# Patient Record
Sex: MALE | Race: WHITE | URBAN - METROPOLITAN AREA
[De-identification: names, ages, dates, MRNs, and addresses within clinical notes are randomized per-mention and may not be internally consistent; named-entity substitution may affect disease eponyms.]

---

## 2017-06-13 ENCOUNTER — OFFICE VISIT (OUTPATIENT)
Dept: URGENT CARE | Facility: URGENT CARE | Age: 52
End: 2017-06-13
Payer: COMMERCIAL

## 2017-06-13 ENCOUNTER — HOSPITAL ENCOUNTER (OUTPATIENT)
Facility: CLINIC | Age: 52
Setting detail: OBSERVATION
Discharge: HOME | End: 2017-06-14
Attending: PHYSICIAN ASSISTANT | Admitting: HOSPITALIST
Payer: COMMERCIAL

## 2017-06-13 ENCOUNTER — APPOINTMENT (OUTPATIENT)
Dept: GENERAL RADIOLOGY | Facility: CLINIC | Age: 52
End: 2017-06-13
Attending: PHYSICIAN ASSISTANT
Payer: COMMERCIAL

## 2017-06-13 VITALS
OXYGEN SATURATION: 98 % | HEART RATE: 80 BPM | SYSTOLIC BLOOD PRESSURE: 160 MMHG | TEMPERATURE: 98.1 F | DIASTOLIC BLOOD PRESSURE: 106 MMHG | WEIGHT: 250 LBS

## 2017-06-13 DIAGNOSIS — R07.9 ACUTE CHEST PAIN: ICD-10-CM

## 2017-06-13 DIAGNOSIS — R10.13 ABDOMINAL PAIN, EPIGASTRIC: Primary | ICD-10-CM

## 2017-06-13 DIAGNOSIS — R07.89 ATYPICAL CHEST PAIN: ICD-10-CM

## 2017-06-13 DIAGNOSIS — I10 BENIGN ESSENTIAL HYPERTENSION: ICD-10-CM

## 2017-06-13 LAB
ALBUMIN SERPL-MCNC: 4 G/DL (ref 3.4–5)
ALP SERPL-CCNC: 71 U/L (ref 40–150)
ALT SERPL W P-5'-P-CCNC: 66 U/L (ref 0–70)
ANION GAP SERPL CALCULATED.3IONS-SCNC: 9 MMOL/L (ref 3–14)
AST SERPL W P-5'-P-CCNC: 36 U/L (ref 0–45)
BASOPHILS # BLD AUTO: 0 10E9/L (ref 0–0.2)
BASOPHILS NFR BLD AUTO: 0.4 %
BILIRUB SERPL-MCNC: 0.5 MG/DL (ref 0.2–1.3)
BUN SERPL-MCNC: 23 MG/DL (ref 7–30)
CALCIUM SERPL-MCNC: 8.8 MG/DL (ref 8.5–10.1)
CHLORIDE SERPL-SCNC: 109 MMOL/L (ref 94–109)
CO2 SERPL-SCNC: 22 MMOL/L (ref 20–32)
CREAT SERPL-MCNC: 0.84 MG/DL (ref 0.66–1.25)
D DIMER PPP FEU-MCNC: NORMAL UG/ML FEU (ref 0–0.5)
DIFFERENTIAL METHOD BLD: NORMAL
EOSINOPHIL # BLD AUTO: 0.1 10E9/L (ref 0–0.7)
EOSINOPHIL NFR BLD AUTO: 1.1 %
ERYTHROCYTE [DISTWIDTH] IN BLOOD BY AUTOMATED COUNT: 12.8 % (ref 10–15)
GFR SERPL CREATININE-BSD FRML MDRD: ABNORMAL ML/MIN/1.7M2
GLUCOSE SERPL-MCNC: 123 MG/DL (ref 70–99)
HBA1C MFR BLD: 5.3 % (ref 4.3–6)
HCT VFR BLD AUTO: 40.3 % (ref 40–53)
HGB BLD-MCNC: 14.2 G/DL (ref 13.3–17.7)
IMM GRANULOCYTES # BLD: 0 10E9/L (ref 0–0.4)
IMM GRANULOCYTES NFR BLD: 0.1 %
INTERPRETATION ECG - MUSE: NORMAL
LIPASE SERPL-CCNC: 207 U/L (ref 73–393)
LYMPHOCYTES # BLD AUTO: 1.5 10E9/L (ref 0.8–5.3)
LYMPHOCYTES NFR BLD AUTO: 22.1 %
MCH RBC QN AUTO: 31.6 PG (ref 26.5–33)
MCHC RBC AUTO-ENTMCNC: 35.2 G/DL (ref 31.5–36.5)
MCV RBC AUTO: 90 FL (ref 78–100)
MONOCYTES # BLD AUTO: 0.2 10E9/L (ref 0–1.3)
MONOCYTES NFR BLD AUTO: 3 %
NEUTROPHILS # BLD AUTO: 5.1 10E9/L (ref 1.6–8.3)
NEUTROPHILS NFR BLD AUTO: 73.3 %
NRBC # BLD AUTO: 0 10*3/UL
NRBC BLD AUTO-RTO: 0 /100
PLATELET # BLD AUTO: 188 10E9/L (ref 150–450)
POTASSIUM SERPL-SCNC: 4.4 MMOL/L (ref 3.4–5.3)
PROT SERPL-MCNC: 7.5 G/DL (ref 6.8–8.8)
RBC # BLD AUTO: 4.5 10E12/L (ref 4.4–5.9)
SODIUM SERPL-SCNC: 140 MMOL/L (ref 133–144)
TROPONIN I SERPL-MCNC: NORMAL UG/L (ref 0–0.04)
TROPONIN I SERPL-MCNC: NORMAL UG/L (ref 0–0.04)
WBC # BLD AUTO: 7 10E9/L (ref 4–11)

## 2017-06-13 PROCEDURE — 25000132 ZZH RX MED GY IP 250 OP 250 PS 637: Performed by: PHYSICIAN ASSISTANT

## 2017-06-13 PROCEDURE — 80053 COMPREHEN METABOLIC PANEL: CPT | Performed by: PHYSICIAN ASSISTANT

## 2017-06-13 PROCEDURE — 85025 COMPLETE CBC W/AUTO DIFF WBC: CPT | Performed by: PHYSICIAN ASSISTANT

## 2017-06-13 PROCEDURE — 84484 ASSAY OF TROPONIN QUANT: CPT | Performed by: PHYSICIAN ASSISTANT

## 2017-06-13 PROCEDURE — G0378 HOSPITAL OBSERVATION PER HR: HCPCS

## 2017-06-13 PROCEDURE — 83036 HEMOGLOBIN GLYCOSYLATED A1C: CPT | Performed by: PHYSICIAN ASSISTANT

## 2017-06-13 PROCEDURE — 71020 XR CHEST 2 VW: CPT

## 2017-06-13 PROCEDURE — 36415 COLL VENOUS BLD VENIPUNCTURE: CPT | Performed by: HOSPITALIST

## 2017-06-13 PROCEDURE — 99220 ZZC INITIAL OBSERVATION CARE,LEVL III: CPT | Performed by: HOSPITALIST

## 2017-06-13 PROCEDURE — 84484 ASSAY OF TROPONIN QUANT: CPT | Performed by: HOSPITALIST

## 2017-06-13 PROCEDURE — 99285 EMERGENCY DEPT VISIT HI MDM: CPT | Mod: 25

## 2017-06-13 PROCEDURE — 99207 ZZC OFFICE-HOSPITAL ADMIT: CPT | Performed by: PHYSICIAN ASSISTANT

## 2017-06-13 PROCEDURE — 85379 FIBRIN DEGRADATION QUANT: CPT | Performed by: PHYSICIAN ASSISTANT

## 2017-06-13 PROCEDURE — 93005 ELECTROCARDIOGRAM TRACING: CPT

## 2017-06-13 PROCEDURE — 83690 ASSAY OF LIPASE: CPT | Performed by: PHYSICIAN ASSISTANT

## 2017-06-13 PROCEDURE — 93005 ELECTROCARDIOGRAM TRACING: CPT | Performed by: PHYSICIAN ASSISTANT

## 2017-06-13 RX ORDER — NITROGLYCERIN 0.4 MG/1
0.4 TABLET SUBLINGUAL EVERY 5 MIN PRN
Status: DISCONTINUED | OUTPATIENT
Start: 2017-06-13 | End: 2017-06-13

## 2017-06-13 RX ORDER — NITROGLYCERIN 0.4 MG/1
0.4 TABLET SUBLINGUAL EVERY 5 MIN PRN
Status: DISCONTINUED | OUTPATIENT
Start: 2017-06-13 | End: 2017-06-14 | Stop reason: HOSPADM

## 2017-06-13 RX ORDER — ROSUVASTATIN CALCIUM 10 MG/1
10 TABLET, COATED ORAL DAILY
COMMUNITY

## 2017-06-13 RX ORDER — ASPIRIN 81 MG/1
162 TABLET, CHEWABLE ORAL ONCE
Status: DISCONTINUED | OUTPATIENT
Start: 2017-06-13 | End: 2017-06-13

## 2017-06-13 RX ORDER — ONDANSETRON 4 MG/1
4 TABLET, ORALLY DISINTEGRATING ORAL EVERY 6 HOURS PRN
Status: DISCONTINUED | OUTPATIENT
Start: 2017-06-13 | End: 2017-06-14 | Stop reason: HOSPADM

## 2017-06-13 RX ORDER — ASPIRIN 81 MG/1
324 TABLET, CHEWABLE ORAL ONCE
Status: COMPLETED | OUTPATIENT
Start: 2017-06-13 | End: 2017-06-13

## 2017-06-13 RX ORDER — NALOXONE HYDROCHLORIDE 0.4 MG/ML
.1-.4 INJECTION, SOLUTION INTRAMUSCULAR; INTRAVENOUS; SUBCUTANEOUS
Status: DISCONTINUED | OUTPATIENT
Start: 2017-06-13 | End: 2017-06-14 | Stop reason: HOSPADM

## 2017-06-13 RX ORDER — MORPHINE SULFATE 2 MG/ML
1 INJECTION, SOLUTION INTRAMUSCULAR; INTRAVENOUS
Status: DISCONTINUED | OUTPATIENT
Start: 2017-06-13 | End: 2017-06-14 | Stop reason: HOSPADM

## 2017-06-13 RX ORDER — ONDANSETRON 2 MG/ML
4 INJECTION INTRAMUSCULAR; INTRAVENOUS EVERY 6 HOURS PRN
Status: DISCONTINUED | OUTPATIENT
Start: 2017-06-13 | End: 2017-06-14 | Stop reason: HOSPADM

## 2017-06-13 RX ORDER — OXYCODONE AND ACETAMINOPHEN 5; 325 MG/1; MG/1
1-2 TABLET ORAL EVERY 4 HOURS PRN
Status: DISCONTINUED | OUTPATIENT
Start: 2017-06-13 | End: 2017-06-14 | Stop reason: HOSPADM

## 2017-06-13 RX ORDER — ALUMINA, MAGNESIA, AND SIMETHICONE 2400; 2400; 240 MG/30ML; MG/30ML; MG/30ML
15-30 SUSPENSION ORAL EVERY 4 HOURS PRN
Status: DISCONTINUED | OUTPATIENT
Start: 2017-06-13 | End: 2017-06-14 | Stop reason: HOSPADM

## 2017-06-13 RX ORDER — ASPIRIN 81 MG/1
81 TABLET ORAL DAILY
Status: DISCONTINUED | OUTPATIENT
Start: 2017-06-14 | End: 2017-06-14 | Stop reason: HOSPADM

## 2017-06-13 RX ORDER — ACETAMINOPHEN 325 MG/1
650 TABLET ORAL EVERY 4 HOURS PRN
Status: DISCONTINUED | OUTPATIENT
Start: 2017-06-13 | End: 2017-06-14 | Stop reason: HOSPADM

## 2017-06-13 RX ORDER — ACETAMINOPHEN 650 MG/1
650 SUPPOSITORY RECTAL EVERY 4 HOURS PRN
Status: DISCONTINUED | OUTPATIENT
Start: 2017-06-13 | End: 2017-06-14 | Stop reason: HOSPADM

## 2017-06-13 RX ORDER — LIDOCAINE 40 MG/G
CREAM TOPICAL
Status: DISCONTINUED | OUTPATIENT
Start: 2017-06-13 | End: 2017-06-14 | Stop reason: HOSPADM

## 2017-06-13 RX ADMIN — ASPIRIN 81 MG 324 MG: 81 TABLET ORAL at 15:20

## 2017-06-13 RX ADMIN — NITROGLYCERIN 0.4 MG: 0.4 TABLET SUBLINGUAL at 14:15

## 2017-06-13 RX ADMIN — NITROGLYCERIN 0.4 MG: 0.4 TABLET SUBLINGUAL at 14:45

## 2017-06-13 ASSESSMENT — ENCOUNTER SYMPTOMS
DIAPHORESIS: 1
SHORTNESS OF BREATH: 0
NAUSEA: 1
ABDOMINAL PAIN: 1
VOMITING: 0

## 2017-06-13 NOTE — PROGRESS NOTES
"SUBJECTIVE  HPI:  Dwight Barry is a 52 year old male who presents with the CC of abdominal/epigastric pain, pain into the back    Pain is located in the epigastric area, with radiation to back.  The pain is characterized as sharp, stabbing and \"pain\", and at worst is a level 8 on a scale of 1-10.  Pain has been present for 1 hours(s) and is fluctuating.  EXACERBATING FACTORS: nothing makes it better or worsen  RELIEVING FACTORS: nothing.  ASSOCIATED SX: sweating.    No past medical history on file.     ALLERGIES  No Known Allergies     Social History   Substance Use Topics     Smoking status: Never Smoker     Smokeless tobacco: Not on file     Alcohol use Not on file       ROS:  CONSTITUTIONAL:NEGATIVE for fever, chills, change in weight  INTEGUMENTARY/SKIN: POSITIVE for mild sweating  ENT/MOUTH: NEGATIVE for ear, mouth and throat problems  RESP:NEGATIVE for significant cough or SOB  CV: NEGATIVE for chest pain, palpitations or peripheral edema  GI: Positive for epigastric pain, sharp and localized  MUSCULOSKELETAL: Positive for back ache  NEURO: NEGATIVE for weakness, dizziness or paresthesias    OBJECTIVE:  BP (!) 160/106 (BP Location: Left arm, Patient Position: Sitting, Cuff Size: Adult Large)  Pulse 80  Temp 98.1  F (36.7  C) (Oral)  Wt 250 lb (113.4 kg)  SpO2 98%  GENERAL APPEARANCE: healthy, alert and no distress  NECK: supple, nontender, no lymphadenopathy  RESP: lungs clear to auscultation - no rales, rhonchi or wheezes  CV: regular rates and rhythm, normal S1 S2, no murmur noted  ABDOMEN: tenderness moderate epigastric  MS: Positive for backache  NEURO: Normal strength and tone, sensory exam grossly normal,  normal speech and mentation  SKIN: no suspicious lesions or rashes    ASSESSMENT/PLAN:      ICD-10-CM    1. Chest pain R07.9 EKG 12-LEAD RADIOLOGY   2. Abdominal pain, epigastric R10.13 lidocaine 15 mL, alum & mag hydroxide-simethicone 15 mL GI Cocktail       Patient given GI cocktail , no " improvement in symptoms  EMS called  Patient taken to [facility]  Patient See Orders in Epic

## 2017-06-13 NOTE — ED NOTES
[facility]  ED Nurse Handoff Report    ED Chief complaint: Abdominal Pain (sudden onset of epigastric pain during a meeting and went to urgent care they called EMS to bring him here for futher eval )      ED Diagnosis:   Final diagnoses:   None       Code Status: Full Code    Allergies: No Known Allergies    Activity level - Baseline/Home:  Independent    Activity Level - Current:   Independent     Needed?: No    Isolation: No  Infection: Not Applicable    Bariatric?: No    Vital Signs:   Vitals:    06/13/17 1300 06/13/17 1411 06/13/17 1413 06/13/17 1414   BP: (!) 155/105 (!) 165/103  (!) 165/103   Resp: 10      Temp:       TempSrc:       SpO2: 97%  100% 97%   Height:           Cardiac Rhythm: ,        Pain level: 0-10 Pain Scale: 1    Is this patient confused?: No    Patient Report: Initial Complaint: Patient came into the ED with abdominal pain described as burning and constant.   Focused Assessment: Respiratory, cardiac and neuro systems were checked per standard as well as GI assessment completed due to pain complaints. Pt is staying over night to rule out cardiac   Tests Performed:Patient had chest x ray, PA and Lat as well as CMP, troponin, EKG and D-dimer completed    Abnormal Results: Patient blood work showed an elevated Blood glucose and he has also had a few elevated blood pressure readings but these seemed to normalize after the admin of the two nitroglycerin tabs   Treatments provided: Patient was given Nitroglycerin tablets (X2)     Family Comments: none; pt from [place] and is here on business     OBS brochure/video discussed/provided to patient: No    ED Medications:   Medications   nitroglycerin (NITROSTAT) sublingual tablet 0.4 mg (0.4 mg Sublingual Given 6/13/17 1445)       Drips infusing?:  No      ED NURSE PHONE NUMBER: *[phone]

## 2017-06-13 NOTE — IP AVS SNAPSHOT
[facility] Observation Unit    [address] [place] [id number]    Phone:  [phone]                                       After Visit Summary   6/13/2017    Dwight Barry    MRN: [id number]           After Visit Summary Signature Page     I have received my discharge instructions, and my questions have been answered. I have discussed any challenges I see with this plan with the nurse or doctor.    ..........................................................................................................................................  Patient/Patient Representative Signature      ..........................................................................................................................................  Patient Representative Print Name and Relationship to Patient    ..................................................               ................................................  Date                                            Time    ..........................................................................................................................................  Reviewed by Signature/Title    ...................................................              ..............................................  Date                                                            Time

## 2017-06-13 NOTE — IP AVS SNAPSHOT
MRN:[id number]                      After Visit Summary   6/13/2017    Dwight Barry    MRN: [id number]           Thank you!     Thank you for choosing [facility] for your care. Our goal is always to provide you with excellent care. Hearing back from our patients is one way we can continue to improve our services. Please take a few minutes to complete the written survey that you may receive in the mail after you visit with us. Thank you!        Patient Information     Date Of Birth          1965        About your hospital stay     You were admitted on:  June 13, 2017 [facility]    You were discharged on:  June 14, 2017        Reason for your hospital stay       You were registered to observation due to sternal pain.                  Who to Call     For medical emergencies, please call 911.  For non-urgent questions about your medical care, please call your primary care provider or clinic, None          Attending Provider     Provider Specialty    Viki Finley PA-C Physician Assistant    Juan David Avitia MD Internal Medicine       Primary Care Provider    None Specified      After Care Instructions     Activity       Your activity upon discharge: activity as tolerated            Diet       Follow this diet upon discharge: Orders Placed This Encounter      Combination Diet Low Saturated Fat Na <2400mg Diet, No Caffeine Diet                  Follow-up Appointments     Follow-up and recommended labs and tests        Follow up with primary care provider, No primary care provider on file., within 7 days for hospital follow- up.  The following labs/tests are recommended: Recommend outpatient cardiac stress test.                  Pending Results     No orders found for last 3 day(s).            Statement of Approval     Ordered          06/14/17 0820  I have reviewed and agree with all the recommendations and orders detailed in this  "document.  EFFECTIVE NOW     Approved and electronically signed by:  Stephen Allred PA-C             Admission Information     Date & Time Provider Department Dept. Phone    2017 Juan David Avitia MD [facility] Observation Unit [phone]      Your Vitals Were     Blood Pressure Temperature Respirations Height Pulse Oximetry BMI (Body Mass Index)    132/79 (BP Location: Right arm) 97.6  F (36.4  C) (Oral) 16 1.778 m (5' 10\") 98% 35.87 kg/m2      MyChart Information     [facility] lets you send messages to your doctor, view your test results, renew your prescriptions, schedule appointments and more. To sign up, go to [email or web address] . Click on \"Log in\" on the left side of the screen, which will take you to the Welcome page. Then click on \"Sign up Now\" on the right side of the page.     You will be asked to enter the access code listed below, as well as some personal information. Please follow the directions to create your username and password.     Your access code is: [email or web address]  Expires: 2017 12:30 PM     Your access code will  in 90 days. If you need help or a new code, please call your [facility] clinic or [phone].        Care EveryWhere ID     This is your Care EveryWhere ID. This could be used by other organizations to access your [facility] medical records  [id number]           Review of your medicines      CONTINUE these medicines which have NOT CHANGED        Dose / Directions    ALLEGRA PO   Used for:  Abdominal pain, epigastric, Atypical chest pain        Dose:  180 mg   Take 180 mg by mouth daily as needed   Refills:  0       CRESTOR 10 MG tablet   Used for:  Abdominal pain, epigastric, Atypical chest pain   Generic drug:  rosuvastatin        Dose:  10 mg   Take 10 mg by mouth daily   Refills:  0       VITAMIN D (CHOLECALCIFEROL) PO        Dose:  5000 Units   Take 5,000 Units by mouth daily   Refills:  0         STOP taking     lidocaine 15 mL, alum & mag " hydroxide-simethicone 15 mL GI Cocktail                    Protect others around you: Learn how to safely use, store and throw away your medicines at www.disposemymeds.org.             Medication List: This is a list of all your medications and when to take them. Check marks below indicate your daily home schedule. Keep this list as a reference.      Medications           Morning Afternoon Evening Bedtime As Needed    ALLEGRA PO   Take 180 mg by mouth daily as needed                                CRESTOR 10 MG tablet   Take 10 mg by mouth daily   Generic drug:  rosuvastatin                                VITAMIN D (CHOLECALCIFEROL) PO   Take 5,000 Units by mouth daily

## 2017-06-13 NOTE — ED PROVIDER NOTES
History     Chief Complaint:  Abdominal Pain      HPI   Dwight Barry is a 52 year old male with a past medical history of hyperlipidemia who presents via EMS for evaluation of abdominal pain.  The patient reports he had sudden onset of epigastric abdominal pain radiating into his chest around 1045 while in a work meeting. The pain was radiating into his back with associated nausea and diaphoresis and so he presented at urgent care, but ambulance was called to bring the patient to the ED for evaluation. He was given a cocktail in the ambulance which he notes helped his pain slightly, currently rated as a 3/10 in severity. He denies shortness of breath or vomiting. He reports having two drinks last night but denies excessive alcohol use. He is s/p cholecystectomy and appendectomy.     Cardiac/PE/DVT Risk Factors:  The patient has  history of hyperlipidemia but no history of hypertension, diabetes, no smoking. He reports no family history of CAD. The patient denies any personal or familial history of PE, DVT, or clotting disorder. The patient reports no recent travel, surgery, or other immobilizations.       Allergies:  NKDA    Medications:    Crestor  Allegra  Lidocaine     Past Medical History:    Hyperlipidemia     Past Surgical History:    Cholecystectomy  Diverticulitis surgery     Family History:    History reviewed.  No significant family history.     Social History:  Marital Status:     Smoking status: Never smoker  Alcohol use: Yes     Review of Systems   Constitutional: Positive for diaphoresis.   Respiratory: Negative for shortness of breath.    Cardiovascular: Positive for chest pain. Negative for leg swelling.   Gastrointestinal: Positive for abdominal pain and nausea. Negative for vomiting.   All other systems reviewed and are negative.    Physical Exam     Patient Vitals for the past 24 hrs:   BP Temp Temp src Heart Rate Resp SpO2 Height   06/13/17 1558 125/82 97.7  F (36.5  C) Oral 63 12 97  "% -   06/13/17 1532 - - - - - 98 % -   06/13/17 1530 116/84 - - - - - -   06/13/17 1515 - - - - - 97 % -   06/13/17 1500 107/79 - - - - 97 % -   06/13/17 1414 (!) 165/103 - - 67 - 97 % -   06/13/17 1413 - - - - - 100 % -   06/13/17 1411 (!) 165/103 - - - - - -   06/13/17 1300 (!) 155/105 - - 67 10 97 % -   06/13/17 1255 (!) 163/120 98.4  F (36.9  C) Oral 69 - 99 % 1.778 m (5' 10\")      Physical Exam  Nursing note and vitals reviewed.     GENERAL: Alert, mild distress, non toxic appearing.   HEENT: Normal conjunctiva. No scleral icterus. MMM.   NECK: Supple.  CARDIAC: Normal rate and regular rhythm. Normal heart sounds. No murmurs, rubs, or gallops appreciated. Intact distal radial pulses 2+.   PULMONARY: CTA bilaterally. Normal breath sounds. No wheezing, crackles, or rhonchi appreciated.  ABDOMEN: Soft, non distended abdomen. Mild epigastric tenderness. No rebound or guarding.   NEURO: Alert and oriented. Non-focal.    MUSCULOSKELETAL: Normal range of motion. No peripheral edema.    SKIN: Skin is warm and dry. No rashes. No pallor or jaundice.   PSYCH: Normal affect and mood.       Emergency Department Course   ECG @ 1317  Indication: Epigastric pain  Rate 64 bpm.   WI interval 190 ms.   QRS duration 80 ms.   QT/QTc 382/394 ms.   P-R-T axes -8.  Notes: Normal sinus rhythm. Inferior infarct, age undetermined.   Time read 1324    Imaging:  Radiographic findings were communicated with the patient who voiced understanding of the findings.    Chest XR,  PA & LAT  Final Result  IMPRESSION: Cardiac silhouette and pulmonary vasculature are within  normal limits. No focal airspace disease, pleural effusion or  pneumothorax.    JAM JARQUIN    Laboratory:  CBC: WBC 7.0 (WNL) HGB 14.2 (WNL)  (WNL)   CMP: Cr 0.84 (WNL) Glucose 123 (H)  Rest WNL  1251: Troponin I: <0.015 (WNL)  Lipase: 207 (WNL)  D dimer: <0.3 (WNL)     Interventions:  1415: Nitroglycerin, 0.4 mg, Sublingual x 2   1520: Aspirin, 324 mg, Oral    ED " Course:  The patient arrived by ambulance. He was escorted to the ED where his vitals were measured and recorded.   Nursing notes and past medical history reviewed.   I performed a physical examination of the patient as documented above.  I explained the plan with the patient who consents to this.   The above workups were undertaken.   1415: The patient was updated.   1500: The patient had complete resolution of his pain after 2 nitro.  I personally reviewed the laboratory and imaging results with the Patient and answered all related questions prior to admission.  Findings and plan explained to the Patient who consents to admission. Discussed the patient with Dr. Avitia at 1513, who will admit the patient to an obs bed for further monitoring, evaluation, and treatment.     Impression & Plan      Medical Decision Making:  Dwight Barry is a 52 year old male with a history of hyperlipidemia who presents with concern for sudden onset of epigastric pain radiating into his chest around 1045 while sitting at a meeting with associated nausea and diaphoresis. Here, he is afebrile, hypertensive but otherwise hemodynamically stable and nontoxic appearing. He does have mild reproducible tenderness over the epigastric region. Lipase is within normal limit making pancreatitis unlikely. No elevation of LFTs and he is status post cholecystectomy. No vomiting or constipation concerning for obstruction. No peritoneal signs thus doubt any acute intraabdominal catastrophe and do not feel advanced imaging with CT is indicated. CXR without evidence of pneumonia, pleural effusion, or pneumothorax. Doubt PE given normal d-dimer and no significant PE risk factors. Low suspicion for dissection given no widened mediastinum on imaging, intact pulses and normal d-dimer.     Initial EKG does not show any acute ischemic changes and troponin is unremarkable. However given his clinical history, I was concerned for possible ACS. I did  administer 2 nitro and he had complete resolution of his pain following this. Aspirin also administered. With his improvement following nitro and timing of onset of symptoms being shortly prior to arrival, admission to the hospital indicated for formal cardiac evaluation including serial troponin and stress test. Patient in agreement with plan. I discussed the patient with Dr. Avitia of hospitalist service who accepted the patient for admission. The patient remained hemodynamically stable in the ED. Blood pressure improving following nitro administration.    Diagnosis:    ICD-10-CM    1. Acute chest pain R07.9    2. Benign essential hypertension I10        Disposition:   Admit to an obs bed under the care of Dr. Avitia.       Delmar KEANE, am serving as a scribe on 6/13/2017 at 1:03 PM to personally document services performed by Viki Finley PA-C based on my observations and the provider's statements to me.       Viki Finley PA-C  06/13/17 9157

## 2017-06-13 NOTE — PHARMACY-ADMISSION MEDICATION HISTORY
Admission medication history interview status for the 6/13/2017  admission is complete. See EPIC admission navigator for prior to admission medications     Medication history source reliability:Good    Actions taken by pharmacist (provider contacted, etc):None     Additional medication history information not noted on PTA med list :None    Medication reconciliation/reorder completed by provider prior to medication history? Yes    Time spent in this activity: 5 min    Prior to Admission medications    Medication Sig Last Dose Taking? Auth Provider   rosuvastatin (CRESTOR) 10 MG tablet Take 10 mg by mouth daily  6/13/2017 at Unknown time Yes Reported, Patient   Fexofenadine HCl (ALLEGRA PO) Take 180 mg by mouth daily as needed  6/13/2017 at Unknown time Yes Reported, Patient   lidocaine 15 mL, alum & mag hydroxide-simethicone 15 mL GI Cocktail 30 mLs by Oral or Feeding Tube route once for 1 dose 6/13/2017 at Unknown time Yes Paul Tam PA-C   VITAMIN D, CHOLECALCIFEROL, PO Take 5,000 Units by mouth daily  6/13/2017 at Unknown time Yes Reported, Patient

## 2017-06-13 NOTE — MR AVS SNAPSHOT
"              After Visit Summary   2017    Dwight Barry    MRN: [id number]           Patient Information     Date Of Birth          1965        Visit Information        Provider Department      2017 12:05 PM Paul Tam PA-C [facility]        Today's Diagnoses     Abdominal pain, epigastric    -  1    Atypical chest pain           Follow-ups after your visit        Who to contact     If you have questions or need follow up information about today's clinic visit or your schedule please contact [facility] directly at [phone].  Normal or non-critical lab and imaging results will be communicated to you by [employer], letter or phone within 4 business days after the clinic has received the results. If you do not hear from us within 7 days, please contact the clinic through [employer] or phone. If you have a critical or abnormal lab result, we will notify you by phone as soon as possible.  Submit refill requests through [employer] or call your pharmacy and they will forward the refill request to us. Please allow 3 business days for your refill to be completed.          Additional Information About Your Visit        MyChart Information     [facility] lets you send messages to your doctor, view your test results, renew your prescriptions, schedule appointments and more. To sign up, go to [email or web address] . Click on \"Log in\" on the left side of the screen, which will take you to the Welcome page. Then click on \"Sign up Now\" on the right side of the page.     You will be asked to enter the access code listed below, as well as some personal information. Please follow the directions to create your username and password.     Your access code is: [email or web address]  Expires: 2017 12:30 PM     Your access code will  in 90 days. If you need help or a new code, please call your [facility] clinic or [phone].        Care EveryWhere ID     This " is your Care EveryWhere ID. This could be used by other organizations to access your [place] medical records  [id number]        Your Vitals Were     Pulse Temperature Pulse Oximetry             80 98.1  F (36.7  C) (Oral) 98%          Blood Pressure from Last 3 Encounters:   06/13/17 (!) 160/106    Weight from Last 3 Encounters:   06/13/17 250 lb (113.4 kg)              We Performed the Following     EKG 12-LEAD RADIOLOGY          Today's Medication Changes          These changes are accurate as of: 6/13/17 12:30 PM.  If you have any questions, ask your nurse or doctor.               Start taking these medicines.        Dose/Directions    lidocaine 15 mL, alum & mag hydroxide-simethicone 15 mL GI Cocktail   Used for:  Abdominal pain, epigastric, Atypical chest pain   Started by:  Paul aTm PA-C        Dose:  30 mL   30 mLs by Oral or Feeding Tube route once for 1 dose   Quantity:  30 mL   Refills:  0            Where to get your medicines      Some of these will need a paper prescription and others can be bought over the counter.  Ask your nurse if you have questions.     You don't need a prescription for these medications     lidocaine 15 mL, alum & mag hydroxide-simethicone 15 mL GI Cocktail                Primary Care Provider    None Specified       No primary provider on file.        Thank you!     Thank you for choosing [facility]  for your care. Our goal is always to provide you with excellent care. Hearing back from our patients is one way we can continue to improve our services. Please take a few minutes to complete the written survey that you may receive in the mail after your visit with us. Thank you!             Your Updated Medication List - Protect others around you: Learn how to safely use, store and throw away your medicines at www.disposemymeds.org.          This list is accurate as of: 6/13/17 12:30 PM.  Always use your most recent med list.                    Brand Name Dispense Instructions for use    ALLEGRA PO          CRESTOR 10 MG tablet   Generic drug:  rosuvastatin      Take by mouth daily       lidocaine 15 mL, alum & mag hydroxide-simethicone 15 mL GI Cocktail     30 mL    30 mLs by Oral or Feeding Tube route once for 1 dose

## 2017-06-13 NOTE — NURSING NOTE
No chief complaint on file.      Initial BP (!) 160/106  Pulse 80  Temp 98.1  F (36.7  C) (Oral)  Wt 250 lb (113.4 kg)  SpO2 98% There is no height or weight on file to calculate BMI.  Medication Reconciliation: complete   Denia ALTAMIRANO

## 2017-06-13 NOTE — ED NOTES
Bed: ED05  Expected date:   Expected time:   Means of arrival:   Comments:  518  55 M epigastric pain from clinic  1236

## 2017-06-13 NOTE — H&P
[facility]    History and Physical  Hospitalist       Date of Admission:  6/13/2017    Assessment & Plan   Dwight Barry is a 52 year old male with past history of hyperlipidemia and pre-diabetes who presents with chest pain.    Chest pain, suspect GI related: Presents with substernal and epigastric sharp pain associated with nausea and diaphoresis.  Initial troponin negative.  EKG with T-wave inversion in lead III, unchanged on repeat with resolution of discomfort.  CXR clear.  LFT's, lipase and D-dimer wnl.  Is s/p cholecystectomy but cannot rule out passage of gallstone based on description of pain.  Esophageal spasm or GERD also in differential though no history of these.  Only known risk factor for ACS is hyperlipidemia.  Note he was markedly hypertensive upon arrival - suspect this was due to pain as has subsequently normalized, but may consider uncontrolled blood pressure causing cardiac strain (though no prior history of HTN but has a stressful job).  - rule out ACS with serial trop, check A1C and fasting lipid panel  - daily aspirin  - low suspicion for ACS, will hold on stress test and have him follow up with PCP for this unless he has recurrence of symptoms while here  - hold statin unless rules in    Pre-DM:  Will check A1C but will not monitor POC glucose while here.    DVT Prophylaxis: Low Risk/Ambulatory with no VTE prophylaxis indicated  Code Status: Full Code    Disposition: Expected discharge tomorrow if rules out.    Juan David Avitia    Primary Care Physician   Dr. Schmitz of [facility] in [place]    Chief Complaint   Chest pain    History is obtained from the patient    History of Present Illness   Dwight Barry is a 52 year old male who presents with chest pain.  Patient reports he was sitting in a meeting at approximately 10 or 10:30 this morning when he had sudden onset of sharp pain in the epigastric and substernal region.  This was associated with diaphoresis  and nausea.  Pain progressed throughout the meeting and began to radiate to his back.  He denies any associated palpitations, shortness of breath or lightheadedness and has never had symptoms in the past.  Pain did not worsen with walking out of the office.  He presented to an urgent care where a GI cocktail resulted in no immediate improvement, but he does report his pain gradually resolved over time.  He reported he was not given any nitroglycerin, though the MAR here states he received 2 dose, with the ED PA stating pain resolved with this.  He denies any history of similar symptoms in the past.  He runs approximately 3 miles on the treadmill about three times per week and denies any exertional chest pain or pressure.  He does report he has been more tired over the past three months but otherwise denies any cardiorespiratory symptoms.  He denies any lower extremity edema or orthopnea.  Reports no history of GERD, denies any abdominal distention.  He denies any recent fevers or chills or diarrhea.  He denies any musculoskeletal strain or change in exercise regimen.  He denies any recent cold symptoms or cough.  He is currently pain-free and his review of systems is otherwise negative.    Past Medical History    Hyperlipidemia  Prediabetes    Past Surgical History   History perforated colon status post partial colectomy with colostomy status post takedown  He also had an appendectomy and cholecystectomy during above surgery    Prior to Admission Medications   Prior to Admission Medications   Prescriptions Last Dose Informant Patient Reported? Taking?   Fexofenadine HCl (ALLEGRA PO) 2017 at Unknown time Self Yes Yes   Sig: Take 180 mg by mouth daily as needed    VITAMIN D, CHOLECALCIFEROL, PO 2017 at Unknown time Self Yes Yes   Sig: Take 5,000 Units by mouth daily    lidocaine 15 mL, alum & mag hydroxide-simethicone 15 mL GI Cocktail 2017 at Unknown time Self No Yes   Si mLs by Oral or Feeding  Tube route once for 1 dose   rosuvastatin (CRESTOR) 10 MG tablet 6/13/2017 at Unknown time Self Yes Yes   Sig: Take 10 mg by mouth daily       Facility-Administered Medications: None     Allergies   No Known Allergies    Social History   I have personally reviewed the social history with the patient showing no prior history of tobacco use.  He reports two alcoholic beverages per day.  Denies any illicit drug use.  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    Family History   No family history of heart disease or stroke.    Review of Systems   The 10 point Review of Systems is negative other than noted in the HPI or here.     Physical Exam   Temp: 98.4  F (36.9  C) Temp src: Oral BP: 116/84   Heart Rate: 67 Resp: 10 SpO2: 98 % O2 Device: None (Room air)    Vital Signs with Ranges  Temp:  [98.1  F (36.7  C)-98.4  F (36.9  C)] 98.4  F (36.9  C)  Pulse:  [80] 80  Heart Rate:  [67-69] 67  Resp:  [10] 10  BP: (107-165)/() 116/84  SpO2:  [97 %-100 %] 98 %  0 lbs 0 oz    Constitutional: well developed, well nourished male in no acute distress  Eyes: pupils equal, round and reactive to light, no icterus  HEENT: head normocephalic, atraumatic, mucous membranes moist, no cervical lymphadenopathy  Respiratory: lungs clear to auscultation bilaterally, no crackles or wheezes, no tachypnea  Cardiovascular: regular rate and rhythm, normal S1/S2, no murmur, rubs or gallops  GI: abdomen soft, non-tender, non-distended, normal bowel sounds, no hepatosplenomegaly  Lymph/Hematologic: No peripheral edema  Skin: No rash or bruising  Musculoskeletal: Extremities warm and well perfused  Neurologic: alert and appropriate, cranial nerves grossly intact, gross motor movements intact, strength grossly intact  Psychiatric: normal affect    Data   Data reviewed today:  I personally reviewed the EKG tracing showing T-wave inversion in lead III and the chest x-ray image(s) showing clear lungs without edema or  infiltrate.    Recent Labs  Lab 06/13/17  1251   WBC 7.0   HGB 14.2   MCV 90         POTASSIUM 4.4   CHLORIDE 109   CO2 22   BUN 23   CR 0.84   ANIONGAP 9   SHANKAR 8.8   *   ALBUMIN 4.0   PROTTOTAL 7.5   BILITOTAL 0.5   ALKPHOS 71   ALT 66   AST 36   LIPASE 207   TROPI <0.015The 99th percentile for upper reference range is 0.045 ug/L.  Troponin values in the range of 0.045 - 0.120 ug/L may be associated with risks of adverse clinical events.       Recent Results (from the past 24 hour(s))   Chest XR,  PA & LAT    Narrative    XR CHEST 2 VW 6/13/2017 1:37 PM    COMPARISON: None.    HISTORY: Chest pain.      Impression    IMPRESSION: Cardiac silhouette and pulmonary vasculature are within  normal limits. No focal airspace disease, pleural effusion or  pneumothorax.    JAM JARQUIN

## 2017-06-14 VITALS
HEIGHT: 70 IN | DIASTOLIC BLOOD PRESSURE: 79 MMHG | RESPIRATION RATE: 16 BRPM | BODY MASS INDEX: 35.87 KG/M2 | OXYGEN SATURATION: 98 % | SYSTOLIC BLOOD PRESSURE: 132 MMHG | TEMPERATURE: 97.6 F

## 2017-06-14 LAB
CHOLEST SERPL-MCNC: 151 MG/DL
HDLC SERPL-MCNC: 56 MG/DL
LDLC SERPL CALC-MCNC: 77 MG/DL
NONHDLC SERPL-MCNC: 95 MG/DL
TRIGL SERPL-MCNC: 88 MG/DL
TROPONIN I SERPL-MCNC: NORMAL UG/L (ref 0–0.04)

## 2017-06-14 PROCEDURE — 84484 ASSAY OF TROPONIN QUANT: CPT | Performed by: HOSPITALIST

## 2017-06-14 PROCEDURE — 80061 LIPID PANEL: CPT | Performed by: HOSPITALIST

## 2017-06-14 PROCEDURE — G0378 HOSPITAL OBSERVATION PER HR: HCPCS

## 2017-06-14 PROCEDURE — 36415 COLL VENOUS BLD VENIPUNCTURE: CPT | Performed by: HOSPITALIST

## 2017-06-14 PROCEDURE — 99217 ZZC OBSERVATION CARE DISCHARGE: CPT | Performed by: PHYSICIAN ASSISTANT

## 2017-06-14 PROCEDURE — 25000132 ZZH RX MED GY IP 250 OP 250 PS 637: Performed by: HOSPITALIST

## 2017-06-14 RX ADMIN — ASPIRIN 81 MG: 81 TABLET, COATED ORAL at 08:15

## 2017-06-14 NOTE — PLAN OF CARE
Problem: Discharge Planning  Goal: Discharge Planning (Adult, OB, Behavioral, Peds)  Outcome: Adequate for Discharge Date Met:  06/14/17  A&Ox4. Up independently. Cardiac, no caffeine diet. VSS on RA. Denies CP, SOB, lightheadedness, dizziness. Tele NSR.      Pt given discharge instructions. Questions answered. Follow up appointment to be scheduled by pt in [place] at pt request. Pt to DC home via self.

## 2017-06-14 NOTE — PLAN OF CARE
Problem: Discharge Planning  Goal: Discharge Planning (Adult, OB, Behavioral, Peds)  Outcome: Improving  List all goals to be met before discharge home:   - Serial troponins and stress test complete- not met  - Adequate pain control on oral analgesia- met  - Vital signs normal or at patient baseline- met  - Safe disposition plan has been identified- met  - Nurse to notify provider when observation goals have been met and patient is ready for discharge.     VSS. Denies pain. Tele- SR. Ambulating independent. Tolerating cardiac diet. Denies nausea. Trops neg x2. Pt hoping to d/c in AM if trops negative. Pt lives in [place] and would prefer to do stress test outpatient back home.

## 2017-06-14 NOTE — DISCHARGE SUMMARY
PRIMARY CARE PROVIDER:  Unknown as patient resides in [place].      DATE OF ADMISSION:  06/13/2017      DATE OF DISCHARGE:  06/14/2017      DISCHARGE DIAGNOSES:   1.  Chest pain thought to be secondary to a GI etiology, possibly gastroesophageal reflux disease or esophageal spasm.   2.  Prediabetes.      DISCHARGE MEDICATIONS:       Review of your medicines      CONTINUE these medicines which have NOT CHANGED       Dose / Directions    ALLEGRA PO   Used for:  Abdominal pain, epigastric, Atypical chest pain        Dose:  180 mg   Take 180 mg by mouth daily as needed   Refills:  0       CRESTOR 10 MG tablet   Used for:  Abdominal pain, epigastric, Atypical chest pain   Generic drug:  rosuvastatin        Dose:  10 mg   Take 10 mg by mouth daily   Refills:  0       VITAMIN D (CHOLECALCIFEROL) PO        Dose:  5000 Units   Take 5,000 Units by mouth daily   Refills:  0         STOP taking          lidocaine 15 mL, alum & mag hydroxide-simethicone 15 mL GI Cocktail               ALLERGIES:  NKDA.       DISPOSITION:  Home.      FOLLOWUP WITH RECOMMENDATIONS:  The patient, Dwight Barry, should follow up with primary care provider within 1 week for hospital followup.  Recommend an outpatient cardiac stress test.      ACTIVITY:  As tolerated.      DIET:  Recommend a combination low saturated fat diet, low salt intake with minimal caffeine.      CONSULTS:  None.      LABORATORY, IMAGING AND PROCEDURES:   1.  Routine laboratory studies that included CBC with platelet differential, lipase level, serial troponin, comprehensive metabolic panel.  D-dimer, hemoglobin A1c and fasting lipid profile.   2.  Two-view chest x-ray.   3.  EKG.      PENDING RESULTS:  None.      PHYSICAL EXAMINATION ON DAY OF DISCHARGE:   VITAL SIGNS:  Temperature is 97.6 degrees Fahrenheit with a blood pressure of 132/79, heart rate of 62 beats per minute, respiratory rate of 16, O2 saturation 98% on room air and patient is denying any pain.   GENERAL:   The patient is awake, alert, cooperative, in no apparent distress, alert and oriented x3.   HEENT:  Head is normocephalic, atraumatic.  Moist mucous membranes present.  No exudates noted in the posterior pharynx.  Uvula is midline.   NECK:  Supple, normal range of motion, no tracheal deviation, no cervical lymphadenopathy present.   CARDIOVASCULAR:  Regular rate and rhythm, no rubs, murmurs or gallops appreciated.   PULMONARY:  Lungs are clear to auscultation bilaterally, no wheezes, rhonchi or rales appreciated.   GASTROINTESTINAL:  Bowel sounds are present in all 4 quadrants, soft, nontender, nondistended, obese.   NEUROLOGIC:  Cranial nerves II-XII are grossly intact.  The patient is seen moving all 4 extremities without any difficulty.   EXTREMITIES:  No lower extremity edema noted bilaterally.  Calves are nontender to palpation.      BRIEF HISTORY OF PRESENT ILLNESS:  Dwight Barry is a 52-year-old male with a past medical history significant for hyperlipidemia and prediabetes who was registered to observation due to chest pain.      For full details, please read H&P as written by Dr. Avitia.      HOSPITAL COURSE:   1.  Chest pain:  The patient presented with substernal and epigastric sharp pain associated with nausea and diaphoresis.  The patient underwent ACS workup with serial troponins that were negative, a fasting lipid profile which shows controlled hyperlipidemia on current statin.  EKG was performed and did show T-wave inversion in lead III; however, when repeated showed that this was unchanged.  The patient has had full resolution of his pain.  A chest x-ray was negative, a D-dimer was within normal limits.  Lipase was negative and LFTs were within normal limits.  I believe patient's pain likely represented a GI etiology, possible esophageal spasm or GERD; however, have discussed with the patient and recommend outpatient cardiac stress test when patient returns to [place] and follow up with his  primary care provider.  The patient did receive aspirin during this stay and would likely benefit from initiating low dose aspirin daily, but will defer this to primary care provider.   2.  Hypertension secondary to pain:  Upon arrival to the Emergency Department, the patient's blood pressure was noted to be 163/120.  However, this resolved with no interventions and likely was secondary to pain, I believe.  No interventions are required at discharge and the patient will follow up with primary care provider.   3.  Hyperlipidemia:  Patient had a fasting lipid profile performed that showed a cholesterol of 151, HDL 56, LDL of 77, triglycerides of 88.  The patient's prior to admit Crestor 10 mg daily was held in lieu of observation status and this can be resumed at time of discharge.   4.  Prediabetes:  The patient had a hemoglobin A1c checked and it was noted to be 5.3.  No interventions are required.      CODE STATUS:  Full code.      The patient was discussed with Dr. Parikh who agrees with discharge at this time.  Dr. Parikh will evaluate the patient independently.      Total discharge time less than 30 minutes.         WENDY PARIKH MD       As dictated by AMERICA RAJAN PA-C            D: 2017 08:28   T: 2017 09:01   MT: [email or web address]      Name:     YOVANNY PALMA   MRN:      [id number]        Account:        [id number]   :      1965           Admit Date:                                       Discharge Date:       Document: [id number]

## 2017-06-14 NOTE — PLAN OF CARE
Problem: Discharge Planning  Goal: Discharge Planning (Adult, OB, Behavioral, Peds)  Outcome: Improving  List all goals to be met before discharge home:   - Serial troponins and stress test complete- met  - Adequate pain control on oral analgesia- met  - Vital signs normal or at patient baseline- met  - Safe disposition plan has been identified- met  Pt A&O, VSS, pt remians in SR/SB, no chest pain or epigastric pain, troponin's negative times 3.  Will continue to monitor.

## 2017-06-14 NOTE — PLAN OF CARE
Problem: Discharge Planning  Goal: Discharge Planning (Adult, OB, Behavioral, Peds)  Outcome: Improving    List all goals to be met before discharge home:     1) Serial troponins and stress test complete- Partially met, stress test not ordered at this time    2) Adequate pain control on oral analgesia- Met    3) Vital signs normal or at patient baseline- Met    4)Safe disposition plan has been identified- Met

## 2017-06-14 NOTE — PLAN OF CARE
Problem: Discharge Planning  Goal: Discharge Planning (Adult, OB, Behavioral, Peds)  Outcome: Improving  List all goals to be met before discharge home:   - Serial troponins and stress test complete- not met  - Adequate pain control on oral analgesia- met  - Vital signs normal or at patient baseline- met  - Safe disposition plan has been identified- met  - Nurse to notify provider when observation goals have been met and patient is ready for discharge.

## 2017-06-14 NOTE — PLAN OF CARE
Problem: Discharge Planning  Goal: Discharge Planning (Adult, OB, Behavioral, Peds)  Outcome: Improving  List all goals to be met before discharge home:   - Serial troponins and stress test complete- met  - Adequate pain control on oral analgesia- met  - Vital signs normal or at patient baseline- met  - Safe disposition plan has been identified- met